# Patient Record
Sex: MALE | Race: WHITE
[De-identification: names, ages, dates, MRNs, and addresses within clinical notes are randomized per-mention and may not be internally consistent; named-entity substitution may affect disease eponyms.]

---

## 2019-01-07 ENCOUNTER — HOSPITAL ENCOUNTER (OUTPATIENT)
Age: 64
End: 2019-01-07
Payer: COMMERCIAL

## 2019-01-07 DIAGNOSIS — M17.11: ICD-10-CM

## 2019-01-07 DIAGNOSIS — M76.51: ICD-10-CM

## 2019-01-07 DIAGNOSIS — S83.231A: ICD-10-CM

## 2019-01-07 DIAGNOSIS — M70.51: ICD-10-CM

## 2019-01-07 DIAGNOSIS — M25.561: Primary | ICD-10-CM

## 2019-01-07 PROCEDURE — 73721 MRI JNT OF LWR EXTRE W/O DYE: CPT

## 2019-07-11 ENCOUNTER — HOSPITAL ENCOUNTER (OUTPATIENT)
Dept: HOSPITAL 76 - DI | Age: 64
Discharge: HOME | End: 2019-07-11
Attending: PHYSICIAN ASSISTANT
Payer: COMMERCIAL

## 2019-07-11 DIAGNOSIS — M54.32: ICD-10-CM

## 2019-07-11 DIAGNOSIS — M47.26: Primary | ICD-10-CM

## 2019-07-11 PROCEDURE — 72100 X-RAY EXAM L-S SPINE 2/3 VWS: CPT

## 2019-07-22 ENCOUNTER — HOSPITAL ENCOUNTER (OUTPATIENT)
Age: 64
End: 2019-07-22
Payer: COMMERCIAL

## 2019-07-22 DIAGNOSIS — M48.07: ICD-10-CM

## 2019-07-22 DIAGNOSIS — M47.817: ICD-10-CM

## 2019-07-22 DIAGNOSIS — M54.5: Primary | ICD-10-CM

## 2019-07-22 DIAGNOSIS — M47.816: ICD-10-CM

## 2019-07-22 DIAGNOSIS — M48.061: ICD-10-CM

## 2019-07-22 PROCEDURE — 72148 MRI LUMBAR SPINE W/O DYE: CPT

## 2019-07-22 NOTE — DI.MRI.S_ITS
PROCEDURE:  MR LUMBAR SPINE WO CON  
   
INDICATIONS:  Low back pain  
   
TECHNIQUE:    
Noncontrast sagittal T1 spin echo and T2 fast echo, sagittal STIR, axial T1 and T2 fast   
spin echo through the lumbar spine.  In cases with scoliosis, additional coronal T2 fast   
spin echo may be performed.    
   
COMPARISON:  None.  
   
FINDINGS:    
Image quality:  Excellent.    
   
Alignment and Curvature:  There is trace retrolithesis of L2 on L3, L3 on L4, trace   
anterolithesis of L5 on S1.  Transitional anatomy is noted. For purposes of this   
examination, vertebral bodies are labeled one through 5. Lumbarized first sacral   
vertebral body is noted.  
   
Bone Marrow:  Marrow is of normal overall signal. Increased T1/T2 singnal in the   
vertebral bodies at L4, L5, S1 consistent with hemangiomas,  No acute vertebral body   
compression fractures.    
   
Spinal Cord:  Conus medullaris terminates at the L2 level.  Visualized cord demonstrates   
normal signal and size.    
   
Paraspinous Soft Tissues:  No paravertebral masses.    
   
Discs: Mild/moderate desiccation is present throughout the lumbar spine.  
   
L1-L2:  No disc bulge, spinal stenosis or foraminal narrowing.  
   
L2-L3: Mild disc bulge with small posterior central/left paracentral protrusion. No   
spinal stenosis. Moderate bilateral foraminal narrowing with facet and ligamentum flavum   
hypertrophy.  
   
L3-L4: Mild disc bulge without spinal stenosis. Moderate to severe right and   
mild/moderate left foraminal narrowing with facet and ligamentum flavum hypertrophy.  
   
L4-L5: Mild disc bulge without spinal stenosis. There is a right lateral recess component   
as well as significant facet hypertrophy at this location causing significant compromise   
of the right lateral recess and proximal right foramina. Minimal to mild left foraminal   
narrowing.  
   
L5-S1: Mild disc bulge with moderate spinal stenosis. Severe left foraminal narrowing. In   
addition, there is an extruded fragment causing compromise of the left lateral recess and   
proximal left foramina.  
   
IMPRESSION:  
   
1. Multilevel disc bulges including extruded fragment at left L5-S1 causing significant   
compromise of the left lateral recess and left proximal foramina.  
   
2. Moderate spinal stenosis at L5 S1 secondary to disc bulge as well as marked facet and   
ligamentum flavum arthropathy.  
   
Dictated by: Amina Gonzalez M.D. on 7/22/2019 at 13:19       
Approved by: Amina Gonzalez M.D. on 7/22/2019 at 14:34

## 2020-07-31 ENCOUNTER — HOSPITAL ENCOUNTER (OUTPATIENT)
Age: 65
End: 2020-07-31
Payer: MEDICARE

## 2020-07-31 DIAGNOSIS — Z13.6: Primary | ICD-10-CM

## 2020-07-31 PROCEDURE — 76706 US ABDL AORTA SCREEN AAA: CPT

## 2022-07-27 ENCOUNTER — HOSPITAL ENCOUNTER (OUTPATIENT)
Age: 67
End: 2022-07-27
Payer: MEDICARE

## 2022-07-27 DIAGNOSIS — Z20.822: Primary | ICD-10-CM

## 2022-07-27 DIAGNOSIS — Z01.812: ICD-10-CM

## 2022-07-27 PROCEDURE — 87635 SARS-COV-2 COVID-19 AMP PRB: CPT

## 2022-07-28 ENCOUNTER — HOSPITAL ENCOUNTER (OUTPATIENT)
Age: 67
Discharge: HOME | End: 2022-07-28
Payer: MEDICARE

## 2022-07-28 VITALS
SYSTOLIC BLOOD PRESSURE: 148 MMHG | RESPIRATION RATE: 16 BRPM | OXYGEN SATURATION: 99 % | DIASTOLIC BLOOD PRESSURE: 78 MMHG | HEART RATE: 72 BPM | TEMPERATURE: 97.7 F

## 2022-07-28 VITALS
OXYGEN SATURATION: 100 % | SYSTOLIC BLOOD PRESSURE: 119 MMHG | DIASTOLIC BLOOD PRESSURE: 74 MMHG | HEART RATE: 70 BPM | RESPIRATION RATE: 16 BRPM

## 2022-07-28 VITALS
OXYGEN SATURATION: 100 % | RESPIRATION RATE: 16 BRPM | DIASTOLIC BLOOD PRESSURE: 70 MMHG | SYSTOLIC BLOOD PRESSURE: 123 MMHG | HEART RATE: 77 BPM

## 2022-07-28 VITALS
RESPIRATION RATE: 16 BRPM | SYSTOLIC BLOOD PRESSURE: 104 MMHG | DIASTOLIC BLOOD PRESSURE: 76 MMHG | HEART RATE: 75 BPM | OXYGEN SATURATION: 99 %

## 2022-07-28 VITALS
RESPIRATION RATE: 14 BRPM | HEART RATE: 68 BPM | SYSTOLIC BLOOD PRESSURE: 122 MMHG | TEMPERATURE: 97.7 F | DIASTOLIC BLOOD PRESSURE: 69 MMHG | OXYGEN SATURATION: 100 %

## 2022-07-28 VITALS — BODY MASS INDEX: 24.9 KG/M2

## 2022-07-28 DIAGNOSIS — Z12.11: Primary | ICD-10-CM

## 2022-07-28 DIAGNOSIS — D12.4: ICD-10-CM

## 2022-07-28 PROCEDURE — 45380 COLONOSCOPY AND BIOPSY: CPT

## 2022-07-28 PROCEDURE — 99152 MOD SED SAME PHYS/QHP 5/>YRS: CPT

## 2022-07-28 PROCEDURE — 0DJD8ZZ INSPECTION OF LOWER INTESTINAL TRACT, VIA NATURAL OR ARTIFICIAL OPENING ENDOSCOPIC: ICD-10-PCS | Performed by: SURGERY

## 2023-09-06 ENCOUNTER — HOSPITAL ENCOUNTER (OUTPATIENT)
Age: 68
End: 2023-09-06
Payer: MEDICARE

## 2023-09-06 DIAGNOSIS — M17.11: Primary | ICD-10-CM

## 2023-09-06 DIAGNOSIS — M25.461: ICD-10-CM

## 2023-09-06 DIAGNOSIS — M51.36: ICD-10-CM

## 2023-09-06 DIAGNOSIS — M48.062: ICD-10-CM

## 2023-09-06 DIAGNOSIS — M54.9: ICD-10-CM

## 2023-09-06 DIAGNOSIS — M43.16: ICD-10-CM

## 2023-09-06 DIAGNOSIS — M25.561: ICD-10-CM

## 2023-09-06 DIAGNOSIS — M41.9: ICD-10-CM

## 2023-09-06 DIAGNOSIS — S83.8X1S: ICD-10-CM

## 2023-09-06 PROCEDURE — 99214 OFFICE O/P EST MOD 30 MIN: CPT

## 2023-09-06 PROCEDURE — 72110 X-RAY EXAM L-2 SPINE 4/>VWS: CPT

## 2023-09-06 PROCEDURE — 73562 X-RAY EXAM OF KNEE 3: CPT

## 2024-07-10 ENCOUNTER — HOSPITAL ENCOUNTER (EMERGENCY)
Dept: HOSPITAL 76 - ED | Age: 69
Discharge: HOME | End: 2024-07-10
Payer: MEDICARE

## 2024-07-10 VITALS — DIASTOLIC BLOOD PRESSURE: 76 MMHG | SYSTOLIC BLOOD PRESSURE: 145 MMHG | OXYGEN SATURATION: 98 %

## 2024-07-10 DIAGNOSIS — Z79.899: ICD-10-CM
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
## 2024-07-10 DIAGNOSIS — I10: ICD-10-CM

## 2024-07-10 DIAGNOSIS — S81.811A: Primary | ICD-10-CM

## 2024-07-10 PROCEDURE — 99283 EMERGENCY DEPT VISIT LOW MDM: CPT

## 2024-07-10 PROCEDURE — 12002 RPR S/N/AX/GEN/TRNK2.6-7.5CM: CPT

## 2024-07-10 RX ADMIN — SODIUM CHLORIDE ONE ML: 9 INJECTION, SOLUTION INTRAVENOUS at 18:56

## 2024-11-06 ENCOUNTER — HOSPITAL ENCOUNTER (OUTPATIENT)
Age: 69
End: 2024-11-06
Payer: MEDICARE

## 2024-11-06 DIAGNOSIS — M17.11: ICD-10-CM

## 2024-11-06 DIAGNOSIS — M22.41: ICD-10-CM

## 2024-11-06 DIAGNOSIS — S83.8X1A: ICD-10-CM

## 2024-11-06 DIAGNOSIS — M25.461: ICD-10-CM

## 2024-11-06 DIAGNOSIS — S83.231A: Primary | ICD-10-CM
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
## 2024-11-06 DIAGNOSIS — M25.461: Primary | ICD-10-CM

## 2024-11-06 DIAGNOSIS — S83.271A: ICD-10-CM

## 2024-11-06 DIAGNOSIS — D17.79: ICD-10-CM

## 2024-11-06 LAB
ADD MANUAL DIFF / SLIDE REVIEW: NO
HEMATOCRIT: 43.1 % (ref 41–53)
HEMOGLOBIN: 14.6 G/DL (ref 13.5–17.5)
LYMPHOCYTES # SPEC AUTO: 1500 /UL (ref 1100–4500)
MCV RBC: 90.7 FL (ref 80–100)
MEAN CORPUSCULAR HEMOGLOBIN: 30.7 PG (ref 26–34)
MEAN CORPUSCULAR HGB CONC: 33.9 % (ref 30–36)
PLATELET COUNT: 243 X10^3/UL (ref 150–400)

## 2024-11-06 PROCEDURE — 36415 COLL VENOUS BLD VENIPUNCTURE: CPT

## 2024-11-06 PROCEDURE — 86140 C-REACTIVE PROTEIN: CPT

## 2024-11-06 PROCEDURE — 85025 COMPLETE CBC W/AUTO DIFF WBC: CPT

## 2024-11-06 PROCEDURE — 73721 MRI JNT OF LWR EXTRE W/O DYE: CPT

## 2024-11-07 LAB — MISCELLANEOUS TO LABCORP: (no result)

## 2025-04-16 ENCOUNTER — HOSPITAL ENCOUNTER (OUTPATIENT)
Dept: HOSPITAL 73 - ECHO | Age: 70
End: 2025-04-16
Payer: MEDICARE

## 2025-04-16 DIAGNOSIS — I77.810: Primary | ICD-10-CM

## 2025-04-16 DIAGNOSIS — R94.31: ICD-10-CM

## 2025-04-16 PROCEDURE — 93306 TTE W/DOPPLER COMPLETE: CPT
